# Patient Record
Sex: FEMALE | Race: WHITE
[De-identification: names, ages, dates, MRNs, and addresses within clinical notes are randomized per-mention and may not be internally consistent; named-entity substitution may affect disease eponyms.]

---

## 2020-01-17 NOTE — ULT
RIGHT UPPER QUADRANT ULTRASOUND:

 

Date:  01/17/2020

 

COMPARISON:  None. 

 

HISTORY:  Hepatic cyst. 

 

TECHNIQUE:  Multiplanar Gray scale sonographic imaging of the right upper quadrant obtained. 

 

FINDINGS:

The pancreas is not well evaluated secondary to obscuration by bowel gas. The hepatic parenchyma is m
ildly heterogeneous and echogenic suggesting a degree of steatosis. 

 

The small hypoechoic lesion without internal blood flow within the left lobe of the liver measuring 1
.0 x 0.7 x 0.6 cm suggesting a small, complex cyst, similar when compared to a CT examination perform
ed 07/31/2017. That study demonstrated additional cysts which are not visualized on this study second
arianne to technique. 

 

Common bile duct measures 2-3 mm, within normal limits. No gallbladder wall thickening or pericholecy
stic fluid. No gallstones are noted. 

 

The right kidney measures 13.8 cm in craniocaudal dimension and demonstrates no stone, hydronephrosis
, or mass. Sonographer reports a negative Cyr's sign. 

 

IMPRESSION: 

No acute findings. There is a very small, mildly complex cyst noted within the left lobe of the liver
. Increased echogenicity of the hepatic parenchyma suggests hepatocellular disease, such as steatosis
, limiting detailed assessment of the hepatic parenchyma. 

 

 

POS: [facility]

## 2020-02-27 NOTE — RAD
EXAM:

Two views chest



PROVIDED CLINICAL HISTORY:

None



COMPARISON:

None



FINDINGS:

Cardiac and mediastinal silhouette appears within normal limits. Lungs appear free of significant opa
city. No pleural fluid or pneumothorax apparent.



IMPRESSION:

No evidence for an acute cardiopulmonary process.



Reported By: Justin Fuentes 

Electronically Signed:  2/27/2020 12:28 PM

## 2020-04-08 NOTE — HP
PRIMARY CARE:  Patrick Akins MD



CHIEF COMPLAINT:  Chest discomfort.



HISTORY OF PRESENT ILLNESS:  The patient is a 69-year-old female with hypertension

and hyperlipidemia presented to the emergency room with above complaints. 



The patient was evaluated by Dr. Akins in February for a wellness check.  She was

found to have abnormal EKG.  She was then seen by Dr. Au, who recommended an

echocardiogram.  Due to coronavirus outbreak, echocardiogram has been postponed to

end of April.  Earlier today around 6 a.m., the patient had sudden onset of chest

discomfort along with irregular heartbeat.  The pain was pressure-like, short

lasting without any aggravating or relieving factor.  She denies any associated

nausea, vomiting, diaphoresis, palpitations, or syncope.  She returned from [place]

three weeks ago and denies any new onset fever, chills, loss of smell, cough,

wheezing, or GI symptoms. 



In the emergency room, her initial vital signs showed temperature 98.2, respirations

20, pulse of 86 with a blood pressure 142/91, O2 saturation 96% on room air.  The

EKG showed sinus rhythm with left axis deviation.  D-dimer was negative.  She

received aspirin along with nitroglycerin patch in the emergency room. 



PAST MEDICAL HISTORY:  

1. Hypertension.

2. Hyperlipidemia.

3. Hypothyroidism.

4. Obstructive sleep apnea, on CPAP.

5. Mild intermittent asthma.



PAST SURGICAL HISTORY:  

1. Liver biopsy.

2. .

3. Nasal septum repair.



ALLERGIES:  THE PATIENT DENIES ANY DRUG ALLERGIES.



CURRENT HOME MEDICATION:  

1. Lipitor 20 mg daily.

2. Levothyroxine 175 mcg daily.

3. Lisinopril 10 mg daily.

4. Micronized progesterone 100 mg daily.

5. Estrogen as directed.

6. Dymista nasal spray at bedtime.



FAMILY HISTORY:  Heart disease and hypertension runs in her family.



SOCIAL HISTORY:  The patient currently lives at home with her family.  She is

.  She works as a .  She makes her own decision with the help of

her family. 



REVIEW OF SYSTEMS:  All other review of systems were reviewed and were found

negative. 



PHYSICAL EXAMINATION:

VITAL SIGNS:  As discussed above. 

GENERAL:  A 69-year-old female, in no apparent distress. 

HEENT:  Head, atraumatic and normocephalic.  Sclerae anicteric.  Moist mucous

membranes.  No oral lesion. 

NECK:  Supple.  No JVD.  No carotid bruit. 

LUNGS:  Clear to auscultation bilaterally.  No wheezing, rales, rhonchi. 

HEART:  S1, S2 present.  Regular rate and rhythm.  No rubs or gallops. 

ABDOMEN:  Soft, nontender.  Bowel sounds present. 

EXTREMITIES:  No edema or calf tenderness. 

NEUROLOGIC:  Grossly nonfocal.  Moves all 4 extremities. 

PSYCHIATRY:  Alert, awake, and oriented x3. 

SKIN:  Warm and dry. 

LYMPH NODES:  No palpable lymph nodes in the neck.  Peripheral, vascular, radial

pulses palpable bilaterally. 

MUSCULOSKELETAL:  No joint swelling, tenderness.



LABORATORY FINDINGS:  CBC showed WBC 8.1 with hemoglobin 16.1, hematocrit 46.3,

platelet of 354. 



D-dimer was 0.37. 



Chemistry showed sodium 140, potassium 4.6, chloride 106, bicarb 25, BUN of 14,

creatinine 0.83, magnesium 1.9.  Troponin initially was 0.022.  Repeat troponin was

0.035.  BNP was less than 10. 



IMAGING STUDIES:  Chest x-ray by my review was negative for infiltrate.



CARDIOVASCULAR STUDIES:  EKG by my review as discussed above.



IMPRESSION:  

1. Chest discomfort, rule out acute coronary syndrome.

2. Morbid obesity with a body mass index of 44.1.

3. Hypertension.

4. Hyperlipidemia.

5. Obstructive sleep apnea.

6. Abnormal EKG.

7. Hypothyroidism.

8. History of hepatic cyst.

9. Chronic kidney disease, stage 2.



PLAN:  The patient will be monitored in the Telemetry unit.  Echocardiogram will be

obtained.  We will consult Cardiology, Dr. Au.  We will keep her n.p.o. past

midnight for possible stress testing.  Pulmonary embolism is less likely due to

negative D-dimer.  She is also not hypoxic or tachycardic.  There is no lower

extremity swelling reported.  We will continue aspirin.  Resume home medications. 



The patient understands the above plan of care.







Job ID:  [id number]

## 2020-04-08 NOTE — RAD
PORTABLE CHEST:

 

Date:  04/08/2020

 

HISTORY:  

Chest pressure with irregular heartrate. 

 

FINDINGS:

Heart size is slightly enlarged. Aorta is mildly tortuous. The lungs are clear of infiltrates. There 
are no signs of failure. 

 

IMPRESSION: 

Mild cardiomegaly. 

 

 

POS: [facility]

## 2020-04-09 NOTE — EKG
Test Reason : CP

Blood Pressure : ***/*** mmHG

Vent. Rate : 082 BPM     Atrial Rate : 082 BPM

   P-R Int : 142 ms          QRS Dur : 080 ms

    QT Int : 360 ms       P-R-T Axes : 002 -33 002 degrees

   QTc Int : 420 ms

 

Normal sinus rhythm

Left axis deviation

Low voltage QRS

Inferior infarct , age undetermined

Cannot rule out Anterior infarct , age undetermined

Abnormal ECG

 

Confirmed by ORLANDO SMALLWOOD, DEBBIE (12),  ENOCH BACK ([id number]) on 4/9/2020 2:56:53 PM

 

Referred By: Felix Vanegas           Confirmed By:DEBBIE PERRY MD

## 2020-04-09 NOTE — CON
DATE OF CONSULTATION:  



PRIMARY CARE PHYSICIAN:  Dr. Patrick Akins.



PRIMARY CARDIOLOGIST:  Dr. Mindy Au.



REASON FOR CARDIOLOGY CONSULTATION: chest discomfort.



HISTORY OF PRESENT ILLNESS:  Ms. Sanchez is a very pleasant 69-year-old 

female with significant history of hypertension, hyperlipidemia, hypothyroidism,

sleep apnea, using a CPAP at night.  She presented to emergency department for 
chest

pressure and burning sensation in bilateral lungs for 30 minutes.  She was 
referred

to Dr. Au on 2020 by her primary care doctor for abnormal EKG.  The

patient had an EKG done at Dr. Au' office in February, which shows sinus 
rhythm

with decreased R-wave in V1 through V6 with questionable COPD pattern with heart

rate of 68.  She had an echocardiogram which was yesterday at the hospital with 
EF

60% to 65%, suggestive of diastolic dysfunction, mild LVH, mild-to-moderate LAE,

trace mitral valve regurgitation and tricuspid regurgitation, mild aortic valve

regurgitation.  She also complained of tachycardia at home.  She is a 
.

She had a stethoscope at home.  When she listened to her heart, she noticed her 
heart was pounding very fast

for at least 30 minutes, so the patient decided to present to the emergency

department for further evaluation and treatment.  Since the patient admitted to

hospital, she has not had any tachycardia, chest pain, heaviness, tightness,

shortness of breath, or any other cardiac complaints.  The patient's blood 
pressure

has been stable during this hospitalization.  She just came back from [place], 
2014.  She was quarantined for at least 2 weeks since 2 of her sisters were 
COVID

patient. 



PAST MEDICAL HISTORY:  Hypertension, hyperlipidemia, hypothyroidism, sleep apnea
,

using CPAP at night, history of congenital hole of the liver.  The patient had 
an

ultrasound done in 2020, she was told that the result is normal.  The

patient was told that the patient has emphysema, ectasia of thoracic aorta in 
2018.

The patient's chest x-ray  shows clear of infiltrate of her lungs and

mildly tortuous aorta with mild cardiomyopathy.  Otherwise, no evidence for 
acute

cardiopulmonary process. 



PAST SURGICAL HISTORY:  Liver cyst cauterized in , , and nasal 
septum

repair. 



FAMILY HISTORY:  The patient's mother has a history of hypertension, multiple 
CVAs,

and  due to the cardiac-related complication at the age of 89.  The

patient's father had a history of hypertension and  due to myocardial

infarction.  The patient's younger sister has a history of hypertension. 



SOCIAL HISTORY:  She is .  She has one daughter who is living well.  She

denies any tobacco abuse or illicit drug abuse.  She drinks a few wine at least 
5

times a week.  She does not exercise.  She drinks at least 2 coffee a day. 



ALLERGIES:  SHE IS ALLERGIC TO FIRE ANTS AND BEES, WHICH CAUSE ANAPHYLACTIC 
REACTION.



MEDICATIONS:  She is on:

1. Flonase spray, azelastine twice a day.

2. Levothyroxine 175 mcg once a day.

3. Atorvastatin 20 mg once a day.

4. Lisinopril 10 mg once a day.

5. Vitamin D 5000 units daily.

6. EpiPen.



REVIEW OF SYSTEMS:  12-point review of systems is negative unless otherwise

mentioned in HPI. 



PHYSICAL EXAMINATION:

VITAL SIGNS:  Blood pressure 127/66, pulse is around 50 to 60 and sinus rhythm,

temperature 97.8, O2 saturation 96% on room air, and respiratory rate 17. 

GENERAL:  The patient is alert and oriented x4, not in acute distress. 

HEAD:  Normocephalic, atraumatic. 

EYES:  Extraocular muscle movement intact. 

ENT AND MOUTH:  Oral and nasal mucosa moist without lesions. 

NECK:  Supple.  Normal range of motion.  No JVD.  No bruit or thrill noted at 
the

carotid artery area. 

RESPIRATORY:  Clear to auscultate bilaterally.  No wheezing, rales, or rhonchi

noted. 

CARDIOVASCULAR:  Regular rate and rhythm.  Normal S1 and S2.  There is no S3 or 
S4.

No significant murmur, hives, or thrill noted.  2+ pulses in bilateral upper and

lower extremities.  No edema in the lower extremities. 

ABDOMEN:  Soft, nontender.  No mass to palpitate.  Bowel sounds are present. 

SKIN:  Warm and dry.  No lesion, rash, or erythema noted except discoloration 
in the

bilateral around ankle area. 

MUSCULOSKELETAL:  The patient able to move all extremities.  The patient denied

claudication in the lower extremities. 

NEUROLOGIC:  The patient is alert and oriented x4, nonfocal. 

PSYCHIATRIC:  The patient's mood is appropriate.



LABORATORY DATA:  WBC 8.1, hemoglobin 16.1, hematocrit 46.3, platelets 134.  
Sodium

140, potassium 4.6, BUN 14, creatinine 0.83, glucose 95, magnesium 1.9, AST 12, 
ALT

10, creatine kinase 44.  Troponin is 0.022, 0.035, 0.032, and 0.024.  BNP is 
less

than 10.  TSH is 2.1749. 



ASSESSMENT AND PLAN:  

1. Chest pain.  The patient's symptom was about 30 minutes.  The patient's

12-lead EKG did not show any ST segment changes.  Her troponin levels have been 
within normal range.  The patient's vital signs are stable at

this moment.  The patient is stable enough to discharge today and have a stress 
test

as outpatient.  The patient was recommended to present to the emergency 
department

or call the Dr. Au' office as soon as possible if the patient started having 
any

cardiac-related symptoms. 

2. Hypertension.  The patient's blood pressure is stable at this moment with 
current

medication. 

3. Hyperlipidemia.  The patient is on atorvastatin and.

4. Sleep apnea.  She will have a sleep apnea machine at the bedside.  She is 
using

it every night according to the patient. 

5. Obesity.  The patient was recommend to start watching her diet and weight

management. 



Thank you very much for Cardiology Service to participate in care of this 
patient.

We will follow the patient.  If the patient is stable, the patient can be 
discharged

and have a stress test as outpatient at Dr. Au' office. 





Job ID:  [id number]



MTDD

## 2020-04-10 NOTE — DIS
DATE OF ADMISSION:  04/08/2020



DATE OF DISCHARGE:  04/09/2020



DISCHARGE DISPOSITION:  Home.



FOLLOWUP:  

1. Follow up with primary care physician, Dr. Akins, in 1 week.

2. Follow up with Cardiology, Dr. Au, in 1 to 2 weeks.

3. Event monitor will be arranged by Dr. Au' office.

4. Stress test as outpatient is recommended. 

Patient was seen on the day of discharge. Denies any new complaints.  No new chest

pain or palpitations reported. 



BRIEF HOSPITAL COURSE:  Patient is a 69-year-old female, who presented to the

emergency room with chest discomfort.  Please refer to the history and physical for

further details. 



The patient was admitted to the hospital with a diagnosis of chest discomfort rule

out acute coronary syndrome.  She also had some irregular heartbeat at home.  She

was evaluated by Cardiology, Dr. Au.  She had troponins in the indeterminate

range.  Her electrolytes were normal.  An echocardiogram was obtained that showed

ejection fraction of 60% to 65% with mild left ventricular hypertrophy, mild aortic

regurgitation, and mild tricuspid regurgitation.  An event monitor will be arranged

by Dr. Au.  She is chest pain free at this time.  She will undergo a stress test

as outpatient. 



FINAL DIAGNOSES:  

1. Chest discomfort, acute coronary syndrome ruled out.

2. Hypertension.

3. Hyperlipidemia.

4. Obstructive sleep apnea.

5. Morbid obesity with a BMI of 44.1.

6. Abnormal EKG.

7. Hypothyroidism.

8. Chronic kidney disease, stage 2.

9. History of hepatic cyst. 



Patient understands the above plan of care.







Job ID:  [id number]

## 2020-04-10 NOTE — CON
DATE OF CONSULTATION:  04/09/2020



ADDENDUM:  



INDICATION FOR ADMISSION:  This is a 69-year-old female, for the consultation who

has a history of hypertension, dyslipidemia, hypothyroidism, sleep apnea.  She had

been at home, started noticing some chest discomfort with burning type sensation,

and heaviness.  She presented to the emergency room.  EKG was unremarkable.  Cardiac

enzymes were also unremarkable.  No evidence of indication that she may have had a

myocardial infarction.  She had an echocardiogram which showed some degree of

diastolic dysfunction with normal ejection fraction without evidence of wall motion

abnormalities.  She did have some left atrial dilatation as well as some trace

mitral valve regurgitation and some mild left ventricular hypertrophy.  She had

noticed an episode of tachycardia at home.  She felt her pulse, and she thought she

was having some atrial fibrillation.  This lasted for probably 5 or 10 minutes and

she felt like she needed to be seen in the emergency room.  She came to the

hospital.  She had recently come back from a trip from [place], but denied any

symptoms of a viral etiology.  She had been quarantined, I believe, for 2 weeks

since apparently 2 of her sisters had COVID diagnosis.  At this time, she is

remaining stable in the hospital.  Echo is unremarkable as noted.  Given her

history, she has had no further symptoms as far as her chest discomfort is

concerned.  She is still concerned that she may have COVID virus, but she has not

been evaluated.  At this time, I believe she is stable to discharge to home and

further evaluation can be done as an outpatient.  I did examine her.  Her chest was

clear to auscultation.  Cardiovascular exam revealed a regular rate and rhythm.  She

had no gross murmurs noted.  She had no palpitations.  She had no lower extremity

edema.  Abdominal exam also was unremarkable.  Her blood pressure was 127/66.  She

was afebrile.  Her heart rate was 58 and O2 saturation was 95% on room air.  At this

time, I believe that further workup can be undertaken as an outpatient, and would

suggest the patient be discharged to home.  Otherwise, I would agree with the

assessment and plan as outlined by my nurse practitioner. 







Job ID:  [id number]

## 2020-08-27 ENCOUNTER — HOSPITAL ENCOUNTER (OUTPATIENT)
Dept: HOSPITAL 92 - CTENTCT | Age: 70
Discharge: HOME | End: 2020-08-27
Attending: OTOLARYNGOLOGY
Payer: COMMERCIAL

## 2020-08-27 DIAGNOSIS — J32.9: Primary | ICD-10-CM

## 2020-08-27 PROCEDURE — 70486 CT MAXILLOFACIAL W/O DYE: CPT
